# Patient Record
Sex: FEMALE | Race: OTHER | HISPANIC OR LATINO
[De-identification: names, ages, dates, MRNs, and addresses within clinical notes are randomized per-mention and may not be internally consistent; named-entity substitution may affect disease eponyms.]

---

## 2017-07-05 PROBLEM — Z00.00 ENCOUNTER FOR PREVENTIVE HEALTH EXAMINATION: Status: ACTIVE | Noted: 2017-07-05

## 2017-09-18 ENCOUNTER — APPOINTMENT (OUTPATIENT)
Dept: VASCULAR SURGERY | Facility: CLINIC | Age: 62
End: 2017-09-18
Payer: MEDICARE

## 2017-09-18 DIAGNOSIS — Z86.79 PERSONAL HISTORY OF OTHER DISEASES OF THE CIRCULATORY SYSTEM: ICD-10-CM

## 2017-09-18 PROCEDURE — 37765 STAB PHLEB VEINS XTR 10-20: CPT | Mod: LT

## 2017-09-19 PROBLEM — Z86.79 HISTORY OF HYPERTENSION: Status: RESOLVED | Noted: 2017-09-19 | Resolved: 2017-09-19

## 2017-09-22 ENCOUNTER — APPOINTMENT (OUTPATIENT)
Dept: VASCULAR SURGERY | Facility: CLINIC | Age: 62
End: 2017-09-22
Payer: MEDICARE

## 2017-09-25 ENCOUNTER — APPOINTMENT (OUTPATIENT)
Dept: VASCULAR SURGERY | Facility: CLINIC | Age: 62
End: 2017-09-25
Payer: MEDICARE

## 2017-09-25 DIAGNOSIS — I83.10 VARICOSE VEINS OF UNSPECIFIED LOWER EXTREMITY WITH INFLAMMATION: ICD-10-CM

## 2017-09-25 PROCEDURE — 99024 POSTOP FOLLOW-UP VISIT: CPT

## 2017-10-25 ENCOUNTER — RESULT REVIEW (OUTPATIENT)
Age: 62
End: 2017-10-25

## 2017-10-25 ENCOUNTER — OUTPATIENT (OUTPATIENT)
Dept: OUTPATIENT SERVICES | Facility: HOSPITAL | Age: 62
LOS: 1 days | Discharge: HOME | End: 2017-10-25
Payer: MEDICARE

## 2017-10-25 ENCOUNTER — APPOINTMENT (OUTPATIENT)
Dept: VASCULAR SURGERY | Facility: HOSPITAL | Age: 62
End: 2017-10-25

## 2017-10-25 PROCEDURE — 37765 STAB PHLEB VEINS XTR 10-20: CPT | Mod: 78,LT,GC

## 2017-10-30 LAB — SURGICAL PATHOLOGY STUDY: SIGNIFICANT CHANGE UP

## 2017-11-01 DIAGNOSIS — Z88.0 ALLERGY STATUS TO PENICILLIN: ICD-10-CM

## 2017-11-01 DIAGNOSIS — I10 ESSENTIAL (PRIMARY) HYPERTENSION: ICD-10-CM

## 2017-11-01 DIAGNOSIS — I83.812 VARICOSE VEINS OF LEFT LOWER EXTREMITY WITH PAIN: ICD-10-CM

## 2017-11-01 DIAGNOSIS — E66.9 OBESITY, UNSPECIFIED: ICD-10-CM

## 2017-11-03 ENCOUNTER — APPOINTMENT (OUTPATIENT)
Dept: VASCULAR SURGERY | Facility: CLINIC | Age: 62
End: 2017-11-03
Payer: MEDICARE

## 2017-11-03 PROCEDURE — 99024 POSTOP FOLLOW-UP VISIT: CPT

## 2019-06-20 ENCOUNTER — OTHER (OUTPATIENT)
Age: 64
End: 2019-06-20

## 2019-06-20 ENCOUNTER — APPOINTMENT (OUTPATIENT)
Dept: PULMONOLOGY | Facility: CLINIC | Age: 64
End: 2019-06-20
Payer: MEDICARE

## 2019-06-20 VITALS
DIASTOLIC BLOOD PRESSURE: 85 MMHG | BODY MASS INDEX: 29.82 KG/M2 | TEMPERATURE: 98.2 F | HEIGHT: 65 IN | WEIGHT: 179 LBS | SYSTOLIC BLOOD PRESSURE: 137 MMHG | HEART RATE: 73 BPM | OXYGEN SATURATION: 95 %

## 2019-06-20 DIAGNOSIS — Z82.49 FAMILY HISTORY OF ISCHEMIC HEART DISEASE AND OTHER DISEASES OF THE CIRCULATORY SYSTEM: ICD-10-CM

## 2019-06-20 DIAGNOSIS — Z83.3 FAMILY HISTORY OF DIABETES MELLITUS: ICD-10-CM

## 2019-06-20 DIAGNOSIS — Z82.5 FAMILY HISTORY OF ASTHMA AND OTHER CHRONIC LOWER RESPIRATORY DISEASES: ICD-10-CM

## 2019-06-20 DIAGNOSIS — G47.19 OTHER HYPERSOMNIA: ICD-10-CM

## 2019-06-20 DIAGNOSIS — R06.83 SNORING: ICD-10-CM

## 2019-06-20 DIAGNOSIS — J45.909 UNSPECIFIED ASTHMA, UNCOMPLICATED: ICD-10-CM

## 2019-06-20 PROCEDURE — 99204 OFFICE O/P NEW MOD 45 MIN: CPT

## 2019-06-20 NOTE — HISTORY OF PRESENT ILLNESS
[FreeTextEntry1] : 06/20/2019 :  JAVIER GARCIA is a 64 year retired female with PMHx HTN and snoring who is here for initial evaluation. \par She reports that has been always a bad sleeper. However, for the past 3 years her sleeping has got worst. She also has difficulty initiating and maintaining sleep. She also has been experiencing SIDDIQI while chiming on the stairs and walking. she underwent full cardiac work up and everything was "normal". She never had a Cxr or CT chest as part of her w/u.\par \par Sleep Routine:\par \par She goes to bed at 1-2AM, sleep latency is about 45 min-120min, she wakes up once, WASO is about 20 min and then she is up at 7-8Am. She does not nap. Her ESS is 11/24.\par \par She reports 30lb weight gain for the past 5 years, RLS and leg movement but denies cataplexy, parasomnia, or any other sleep behavioral issues\par \par \par \par \par

## 2019-06-20 NOTE — ASSESSMENT
[FreeTextEntry1] : 63 y/o F with chronic insomnia, SIDDIQI  and snoring who is here for initial evaluation

## 2019-06-20 NOTE — REVIEW OF SYSTEMS
[EDS: ESS=____] : daytime somnolence: ESS=[unfilled] [Recent Wt Gain (___ Lbs)] : recent [unfilled] ~Ulb weight gain [Snoring] : snoring [A.M. Dry Mouth] : a.m. dry mouth [Obesity] : obesity [Nocturia] : nocturia [Negative] : Psychiatric [FreeTextEntry9] : HTN

## 2019-06-20 NOTE — END OF VISIT
[FreeTextEntry3] : Case reviewed and patient examined with PA, plans as noted. Here for evaluation of both snoring and dyspnea.  Has had negative cardiac evaluation.  Gradually worsening SIDDIQI, SOB climbing 2 flights.  Get pulmonary function testing and CXR; overnight sleep study

## 2019-06-20 NOTE — PHYSICAL EXAM
[General Appearance - Well Developed] : well developed [Normal Appearance] : normal appearance [Well Groomed] : well groomed [General Appearance - Well Nourished] : well nourished [No Deformities] : no deformities [General Appearance - In No Acute Distress] : no acute distress [Normal Conjunctiva] : the conjunctiva exhibited no abnormalities [Eyelids - No Xanthelasma] : the eyelids demonstrated no xanthelasmas [IV] : IV [Neck Appearance] : the appearance of the neck was normal [Neck Cervical Mass (___cm)] : no neck mass was observed [Thyroid Diffuse Enlargement] : the thyroid was not enlarged [Jugular Venous Distention Increased] : there was no jugular-venous distention [Thyroid Nodule] : there were no palpable thyroid nodules [Heart Rate And Rhythm] : heart rate was normal and rhythm regular [Heart Sounds] : normal S1 and S2 [Heart Sounds Gallop] : no gallops [Murmurs] : no murmurs [Heart Sounds Pericardial Friction Rub] : no pericardial rub [Auscultation Breath Sounds / Voice Sounds] : lungs were clear to auscultation bilaterally [Abnormal Walk] : normal gait [Musculoskeletal - Swelling] : no joint swelling seen [Motor Tone] : muscle strength and tone were normal [Nail Clubbing] : no clubbing of the fingernails [Cyanosis, Localized] : no localized cyanosis [Petechial Hemorrhages (___cm)] : no petechial hemorrhages [Skin Color & Pigmentation] : normal skin color and pigmentation [Skin Turgor] : normal skin turgor [] : no rash [Cranial Nerves] : cranial nerves 2-12 were intact [Deep Tendon Reflexes (DTR)] : deep tendon reflexes were 2+ and symmetric [Sensation] : the sensory exam was normal to light touch and pinprick [No Focal Deficits] : no focal deficits [Oriented To Time, Place, And Person] : oriented to person, place, and time [Impaired Insight] : insight and judgment were intact [Affect] : the affect was normal

## 2019-06-24 ENCOUNTER — OTHER (OUTPATIENT)
Age: 64
End: 2019-06-24

## 2019-07-08 ENCOUNTER — APPOINTMENT (OUTPATIENT)
Dept: SLEEP CENTER | Facility: HOME HEALTH | Age: 64
End: 2019-07-08
Payer: MEDICARE

## 2019-07-08 ENCOUNTER — OUTPATIENT (OUTPATIENT)
Dept: OUTPATIENT SERVICES | Facility: HOSPITAL | Age: 64
LOS: 1 days | End: 2019-07-08
Payer: MEDICARE

## 2019-07-08 PROCEDURE — 95800 SLP STDY UNATTENDED: CPT

## 2019-07-08 PROCEDURE — 95800 SLP STDY UNATTENDED: CPT | Mod: 26

## 2019-07-09 DIAGNOSIS — G47.33 OBSTRUCTIVE SLEEP APNEA (ADULT) (PEDIATRIC): ICD-10-CM

## 2019-07-12 ENCOUNTER — OTHER (OUTPATIENT)
Age: 64
End: 2019-07-12

## 2019-07-30 ENCOUNTER — FORM ENCOUNTER (OUTPATIENT)
Age: 64
End: 2019-07-30

## 2019-07-31 ENCOUNTER — APPOINTMENT (OUTPATIENT)
Dept: PULMONOLOGY | Facility: CLINIC | Age: 64
End: 2019-07-31
Payer: MEDICARE

## 2019-07-31 ENCOUNTER — OUTPATIENT (OUTPATIENT)
Dept: OUTPATIENT SERVICES | Facility: HOSPITAL | Age: 64
LOS: 1 days | End: 2019-07-31
Payer: MEDICARE

## 2019-07-31 ENCOUNTER — NON-APPOINTMENT (OUTPATIENT)
Age: 64
End: 2019-07-31

## 2019-07-31 VITALS
SYSTOLIC BLOOD PRESSURE: 120 MMHG | WEIGHT: 182 LBS | DIASTOLIC BLOOD PRESSURE: 70 MMHG | HEART RATE: 71 BPM | HEIGHT: 65 IN | OXYGEN SATURATION: 98 % | TEMPERATURE: 98.7 F | BODY MASS INDEX: 30.32 KG/M2 | RESPIRATION RATE: 12 BRPM

## 2019-07-31 DIAGNOSIS — G47.30 SLEEP APNEA, UNSPECIFIED: ICD-10-CM

## 2019-07-31 PROCEDURE — 94010 BREATHING CAPACITY TEST: CPT

## 2019-07-31 PROCEDURE — 99214 OFFICE O/P EST MOD 30 MIN: CPT | Mod: 25

## 2019-07-31 PROCEDURE — 71046 X-RAY EXAM CHEST 2 VIEWS: CPT

## 2019-07-31 PROCEDURE — 71046 X-RAY EXAM CHEST 2 VIEWS: CPT | Mod: 26

## 2019-07-31 NOTE — PHYSICAL EXAM
[Heart Rate And Rhythm] : heart rate and rhythm were normal [Heart Sounds] : normal S1 and S2 [Murmurs] : no murmurs present [Respiration, Rhythm And Depth] : normal respiratory rhythm and effort [Auscultation Breath Sounds / Voice Sounds] : lungs were clear to auscultation bilaterally [Exaggerated Use Of Accessory Muscles For Inspiration] : no accessory muscle use [FreeTextEntry1] : no wheeze even forced exp [Abnormal Walk] : normal gait [Gait - Sufficient For Exercise Testing] : the gait was sufficient for exercise testing [Nail Clubbing] : no clubbing of the fingernails [Petechial Hemorrhages (___cm)] : no petechial hemorrhages [] : no ischemic changes [Cyanosis, Localized] : no localized cyanosis
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
## 2019-07-31 NOTE — HISTORY OF PRESENT ILLNESS
[FreeTextEntry1] : 07/31/2019 :  JAVIER GARCIA is a 64 year female with PMHx HTN, asthma (in childhood, no recent use of inhaler or hospitalization) who is here for follow up for her HPSG.\par \par The test showed AHI 15.5 with minimum desaturation. She is still complaining of SIDDIQI while climbing 2 flights up the stairs. THe PFT which was ordered last month did not get done. She reports seasonal allergy.\par She denies any new diagnosis or medication since the last visit.  \par

## 2019-08-06 ENCOUNTER — APPOINTMENT (OUTPATIENT)
Dept: PULMONOLOGY | Facility: CLINIC | Age: 64
End: 2019-08-06
Payer: MEDICARE

## 2019-08-06 DIAGNOSIS — R06.02 SHORTNESS OF BREATH: ICD-10-CM

## 2019-08-06 PROCEDURE — 94010 BREATHING CAPACITY TEST: CPT

## 2019-08-06 PROCEDURE — 94729 DIFFUSING CAPACITY: CPT

## 2019-08-06 PROCEDURE — 94726 PLETHYSMOGRAPHY LUNG VOLUMES: CPT

## 2019-08-06 PROCEDURE — ZZZZZ: CPT

## 2019-08-08 PROBLEM — R06.02 SOBOE (SHORTNESS OF BREATH ON EXERTION): Status: ACTIVE | Noted: 2019-06-20

## 2019-11-04 ENCOUNTER — APPOINTMENT (OUTPATIENT)
Dept: PULMONOLOGY | Facility: CLINIC | Age: 64
End: 2019-11-04

## 2020-08-04 ENCOUNTER — APPOINTMENT (OUTPATIENT)
Dept: ORTHOPEDIC SURGERY | Facility: CLINIC | Age: 65
End: 2020-08-04
Payer: MEDICARE

## 2020-08-04 VITALS
HEIGHT: 65 IN | OXYGEN SATURATION: 98 % | DIASTOLIC BLOOD PRESSURE: 76 MMHG | WEIGHT: 168 LBS | SYSTOLIC BLOOD PRESSURE: 137 MMHG | BODY MASS INDEX: 27.99 KG/M2 | HEART RATE: 79 BPM

## 2020-08-04 DIAGNOSIS — Z72.3 LACK OF PHYSICAL EXERCISE: ICD-10-CM

## 2020-08-04 DIAGNOSIS — Z60.2 PROBLEMS RELATED TO LIVING ALONE: ICD-10-CM

## 2020-08-04 PROCEDURE — 73030 X-RAY EXAM OF SHOULDER: CPT | Mod: RT

## 2020-08-04 PROCEDURE — 99204 OFFICE O/P NEW MOD 45 MIN: CPT | Mod: 25

## 2020-08-04 PROCEDURE — 20610 DRAIN/INJ JOINT/BURSA W/O US: CPT | Mod: LT

## 2020-08-04 PROCEDURE — 73564 X-RAY EXAM KNEE 4 OR MORE: CPT | Mod: LT

## 2020-08-04 RX ORDER — AMLODIPINE BESYLATE 2.5 MG/1
2.5 TABLET ORAL
Refills: 0 | Status: ACTIVE | COMMUNITY

## 2020-08-04 SDOH — SOCIAL STABILITY - SOCIAL INSECURITY: PROBLEMS RELATED TO LIVING ALONE: Z60.2

## 2020-08-04 NOTE — REASON FOR VISIT
[Initial Visit] : an initial visit for [Shoulder Injury] : Shoulder Injury [Knee Injury] : knee injury

## 2020-08-04 NOTE — PROCEDURE
[Left] : of the left [Injection] : Injection [Knee Joint] : knee joint [Osteoarthritis] : Osteoarthritis [Joint Pain] : Joint pain [Patient] : patient [Benefits] : benefits [Risk] : risk [Bleeding] : bleeding [Infection] : infection [Alternatives] : alternatives [Verbal Consent Obtained] : verbal consent was obtained prior to the procedure [Allergic Reaction] : allergic reaction [Betadine] : Betadine [Alcohol] : Alcohol [Ethyl Chloride Spray] : ethyl chloride spray was used as a topical anesthetic [Anterior] : anterior [Lateral] : lateral [22] : a 22-gauge [1% Lidocaine___(mL)] : [unfilled] mL of 1% Lidocaine [Methylpred. 40mg/mL___(mL)] : [unfilled] mL of 40mg/mL methylprednisolone [Tolerated Well] : The patient tolerated the procedure well [Bandage Applied] : a bandage [No Strenuous Activity___day(s)] : avoid strenuous activity for [unfilled] day(s) [None] : none [de-identified] : ood pain relief after it she could flex about another 10° but still not fully [___ Week(s)] : in [unfilled] week(s)

## 2020-08-04 NOTE — PHYSICAL EXAM
[UE/LE] : Sensory: Intact in bilateral upper & lower extremities [Rad] : radial 2+ and symmetric bilaterally [Normal RUE] : Right Upper Extremity: No scars, rashes, lesions, ulcers, skin intact [Normal LUE] : Left Upper Extremity: No scars, rashes, lesions, ulcers, skin intact [Normal RLE] : Right Lower Extremity: No scars, rashes, lesions, ulcers, skin intact [Normal LLE] : Left Lower Extremity: No scars, rashes, lesions, ulcers, skin intact [Normal Touch] : sensation intact for touch [Normal] : Oriented to person, place, and time, insight and judgement were intact and the affect was normal [Poor Appearance] : well-appearing [Acute Distress] : not in acute distress [de-identified] : rIGHT shoulder/ lEFT for comparison\par Cervical spine is without tenderness and ROM is within normal limits and without pain.\par Normal appearance. No deformity, edema, ecchymoses, erythema of RIGHT shoulder\par AROM: RIGHT 90 versus LEFT 180 FE, IR to T 9 equal , 80 RIGHT versus LEFT 180 abd.\par if she bends her elbow and she can elevate her RIGHT arm overhead to about 170°.\par Painful arc RIGHT shoulder mid rangewith drop arm as she lowers her RIGHT arm from overhead position.\par PROM: plan 70 RIGHT versus LEFT 180 FE, 70 ER at the side, 90 ER and  IR in the 90 degree abducted position.\par Motor:  5-/5  supraspinatus,  5/5 ER, 5/5 IR, 5/5 biceps, 5/5 deltoid.  Normal lift off test\par rIGHT + Neer, + Murdock. -  X-arm.   \par Tender over the biceps tendon and the anterior shoulder.  \par Laxity: normal. \par No scapular winging.\par Sensation is intact distally in the UE.\par Skin is intact in the UE. \par Intact Motor distally.\par \par Knees:\par Mildly antalgic gait.\par - effusion.\par - erythema, edema, warmth.\par Tender Joint lines LEFT knee.\par ROM: 0 degrees extension to 90 degrees flexion LEFT knee versus 125° on the RIGHT knee. severe LEFT greater than RIGHT patellofemoral crepitus\par - Eber.\par 1A Lachman.  - Pivot shift. - posterior drawer. Normal rotational, varus/valgus laxity.\par Intact extensor mechanism.\par NVI distally.\par  [de-identified] : No respiratory distress or cough [de-identified] : \par X-rays RIGHT shoulder AP, Y. lateral and axillary views today show Degenerative changes at the a.c. joint with large undersurface osteophytes. No fractures\par \par X-rays LEFT knee weightbearing 4 views today show moderate medial compartment osteoarthritis with osteophytes and joint space narrowing Minimal degeneration patellofemoral joint. No fractures seen
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
## 2020-09-22 NOTE — REASON FOR VISIT
[Follow-Up Visit] : a follow-up visit for [Shoulder Injury] : Shoulder Injury [Knee Injury] : knee injury

## 2020-09-23 ENCOUNTER — APPOINTMENT (OUTPATIENT)
Dept: ORTHOPEDIC SURGERY | Facility: CLINIC | Age: 65
End: 2020-09-23
Payer: MEDICARE

## 2020-09-23 PROCEDURE — 99215 OFFICE O/P EST HI 40 MIN: CPT

## 2020-09-23 RX ORDER — HYALURONATE SODIUM 20 MG/2 ML
20 SYRINGE (ML) INTRAARTICULAR
Qty: 1 | Refills: 0 | Status: ACTIVE | COMMUNITY
Start: 2020-09-23 | End: 1900-01-01
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
## 2020-09-23 NOTE — PHYSICAL EXAM
[Rad] : radial 2+ and symmetric bilaterally [Normal RUE] : Right Upper Extremity: No scars, rashes, lesions, ulcers, skin intact [Normal LUE] : Left Upper Extremity: No scars, rashes, lesions, ulcers, skin intact [Normal RLE] : Right Lower Extremity: No scars, rashes, lesions, ulcers, skin intact [Normal LLE] : Left Lower Extremity: No scars, rashes, lesions, ulcers, skin intact [Normal Touch] : sensation intact for touch [Normal] : Oriented to person, place, and time, insight and judgement were intact and the affect was normal [UE/LE] : Sensory: Intact in bilateral upper & lower extremities [Poor Appearance] : well-appearing [Acute Distress] : not in acute distress [de-identified] : RIGHT shoulder/ lEFT for comparison\par Cervical spine is without tenderness and ROM is within normal limits and without pain.\par Normal appearance. No deformity, edema, ecchymoses, erythema of RIGHT shoulder\par AROM: RIGHT 170 with painful arc versus LEFT 180 FE, IR to T 9 equal , 100 RIGHT versus LEFT 180 abd.\par if she bends her elbow and she can elevate her RIGHT arm overhead to about 170°.\par Painful arc RIGHT shoulder mid range with drop arm as she lowers her RIGHT arm from overhead position.\par PROM: 170 RIGHT versus LEFT 180 FE, 70 ER at the side, 90 ER and 5 RIGHT and 45 LEFT IR in the 90 degree abducted position.\par Motor:  5-/5  supraspinatus,  5/5 ER, 5/5 IR, 5/5 biceps, 5/5 deltoid.  Normal lift off test\par rIGHT + Neer, + Murdock. -  X-arm.   \par Tender over the biceps tendon and the anterior shoulder.  \par Laxity: normal. \par No scapular winging.\par Sensation is intact distally in the UE.\par Skin is intact in the UE. \par Intact Motor distally.\par \par Knees:\par Mildly antalgic gait. she walks with a stiff legged gait\par small effusion lEFT knee\par - erythema, edema, warmth.\par Tender Joint lines LEFT knee Medial greater than lateral.\par ROM: 0 degrees extension to 90 degrees flexion LEFT knee With severe pain versus 125° on the RIGHT knee. + patellofemoral crepitus\par - Eber.\par 1A Lachman.  - Pivot shift. - posterior drawer. Normal rotational, varus/valgus laxity.\par Intact extensor mechanism.\par NVI distally.\par  [de-identified] : No respiratory distress or cough [de-identified] : \par MRIs of the LEFT knee and RIGHT shoulder were reviewed with the patient. Reports are described above and are in the record. She was given copies of the MRI reports

## 2020-10-26 ENCOUNTER — APPOINTMENT (OUTPATIENT)
Dept: ORTHOPEDIC SURGERY | Facility: CLINIC | Age: 65
End: 2020-10-26

## 2020-10-28 ENCOUNTER — APPOINTMENT (OUTPATIENT)
Dept: ORTHOPEDIC SURGERY | Facility: CLINIC | Age: 65
End: 2020-10-28
Payer: MEDICARE

## 2020-10-28 VITALS — HEIGHT: 65 IN | WEIGHT: 168 LBS | BODY MASS INDEX: 27.99 KG/M2

## 2020-10-28 PROCEDURE — 99072 ADDL SUPL MATRL&STAF TM PHE: CPT

## 2020-10-28 PROCEDURE — 99214 OFFICE O/P EST MOD 30 MIN: CPT | Mod: 25

## 2020-10-28 PROCEDURE — 20610 DRAIN/INJ JOINT/BURSA W/O US: CPT | Mod: LT

## 2020-10-28 NOTE — PROCEDURE
[Injection] : Injection [Left] : of the left [Knee Joint] : knee joint [Osteoarthritis] : Osteoarthritis [Patient] : patient [Risk] : risk [Benefits] : benefits [Alternatives] : alternatives [Bleeding] : bleeding [Infection] : infection [Allergic Reaction] : allergic reaction [Verbal Consent Obtained] : verbal consent was obtained prior to the procedure [Alcohol] : Alcohol [Betadine] : Betadine [Ethyl Chloride Spray] : ethyl chloride spray was used as a topical anesthetic [Lateral] : lateral [Same Needle/New Syringe] : the syringe was changed and the same needle was left in place and [Bandage Applied] : a bandage [Tolerated Well] : The patient tolerated the procedure well [None] : none [No Strenuous Activity___day(s)] : avoid strenuous activity for [unfilled] day(s) [___ Month(s)] : in [unfilled] month(s) [FreeTextEntry8] : Gel-One 3ml, lot [id number], exp 8/18/22

## 2020-10-28 NOTE — PHYSICAL EXAM
[UE/LE] : Sensory: Intact in bilateral upper & lower extremities [Rad] : radial 2+ and symmetric bilaterally [Normal RUE] : Right Upper Extremity: No scars, rashes, lesions, ulcers, skin intact [Normal LUE] : Left Upper Extremity: No scars, rashes, lesions, ulcers, skin intact [Normal RLE] : Right Lower Extremity: No scars, rashes, lesions, ulcers, skin intact [Normal LLE] : Left Lower Extremity: No scars, rashes, lesions, ulcers, skin intact [Normal Touch] : sensation intact for touch [Normal] : Oriented to person, place, and time, insight and judgement were intact and the affect was normal [Poor Appearance] : well-appearing [Acute Distress] : not in acute distress [de-identified] : RIGHT shoulder/ LEFT for comparison\par Cervical spine is without tenderness and ROM is within normal limits and without pain.\par Normal appearance. No deformity, edema, ecchymoses, erythema of RIGHT shoulder\par AROM: RIGHT 175 without Shoulder shrug or any significant pain, IR to T 9 equal , 150 RIGHT versus LEFT 180 abd.\par No drop arm\par PROM: 175 RIGHT versus LEFT 180 FE, 70 ER at the side, 90 ER and 25 RIGHT and 45 LEFT IR in the 90 degree abducted position.\par Motor:  5-/5  supraspinatus,  5/5 ER, 5/5 IR, 5/5 biceps, 5/5 deltoid.  Normal lift off test\par RiGHT + Neer, + Murdock. -  X-arm.   pain is much less however with range of motion and provocative testing today and strength testing\par Tender over the biceps tendon and the anterior shoulder.  \par Laxity: normal. \par No scapular winging.\par Sensation is intact distally in the UE.\par Skin is intact in the UE. \par Intact Motor distally.\par \par Knees:\par Mildly antalgic gait. she walks with a stiff legged gait\par small effusion lEFT knee\par - erythema, edema, warmth.\par Tender Joint lines LEFT knee Medial greater than lateral.\par ROM: 0 degrees extension to 90 degrees flexion LEFT knee with severe pain versus 125° on the RIGHT knee. + patellofemoral crepitus\par - Eber.\par 1A Lachman.  - Pivot shift. - posterior drawer. Normal rotational, varus/valgus laxity.\par Intact extensor mechanism.\par NVI distally.\par  [de-identified] : No respiratory distress or cough [de-identified] : \par MRI of the RIGHT shoulder on August 31, 2020 showed a very small but full-thickness tear of the central supraspinatus with moderate tendinosis rotator cuff tendons. There is also tendinosis of the long head of the biceps and degeneration of the superior labrum. Moderate subacromial subdeltoid bursitis and severe osteoarthritis of the a.c. joint.\par \par MRI of the LEFT knee on September 15, 2020  showed tricompartmental osteoarthritis moderate to severe in the medial compartment and full thickness radial tear of the medial meniscal root and horizontal tear and extrusion of the body of the meniscus and mucoid degeneration of the ACL and the moderate knee effusion.
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
## 2020-12-02 ENCOUNTER — APPOINTMENT (OUTPATIENT)
Dept: ORTHOPEDIC SURGERY | Facility: CLINIC | Age: 65
End: 2020-12-02

## 2021-08-05 DIAGNOSIS — M23.204 DERANGEMENT OF UNSPECIFIED MEDIAL MENISCUS DUE TO OLD TEAR OR INJURY, LEFT KNEE: ICD-10-CM

## 2021-08-06 ENCOUNTER — APPOINTMENT (OUTPATIENT)
Dept: ORTHOPEDIC SURGERY | Facility: CLINIC | Age: 66
End: 2021-08-06
Payer: MEDICARE

## 2021-08-06 DIAGNOSIS — M19.011 PRIMARY OSTEOARTHRITIS, RIGHT SHOULDER: ICD-10-CM

## 2021-08-06 DIAGNOSIS — S46.011A STRAIN OF MUSCLE(S) AND TENDON(S) OF THE ROTATOR CUFF OF RIGHT SHOULDER, INITIAL ENCOUNTER: ICD-10-CM

## 2021-08-06 DIAGNOSIS — S80.02XD CONTUSION OF LEFT KNEE, SUBSEQUENT ENCOUNTER: ICD-10-CM
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
## 2021-08-06 PROCEDURE — 73564 X-RAY EXAM KNEE 4 OR MORE: CPT | Mod: LT

## 2021-08-06 PROCEDURE — 73030 X-RAY EXAM OF SHOULDER: CPT | Mod: RT

## 2021-08-06 PROCEDURE — 99214 OFFICE O/P EST MOD 30 MIN: CPT

## 2021-08-06 RX ORDER — HYLAN G-F 20 16MG/2ML
48 SYRINGE (ML) INTRAARTICULAR
Qty: 2 | Refills: 0 | Status: ACTIVE | OUTPATIENT
Start: 2021-08-06
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
## 2021-08-24 NOTE — PHYSICAL EXAM
[Slightly Antalgic] : slightly antalgic [UE/LE] : Motor: 5/5 motor strength in bilateral upper & lower extremities [Poor Appearance] : well-appearing [Acute Distress] : not in acute distress [de-identified] : RIGHT shoulder/ LEFT for comparison\par Cervical spine is without tenderness and ROM is within normal limits and without pain.\par Normal appearance. No deformity, edema, ecchymoses, erythema of RIGHT shoulder\par AROM: RIGHT 175 without Shoulder shrug or painful arc, IR to T 9 equal , 170 RIGHT versus LEFT 180 abd.\par No drop arm\par PROM: 175 RIGHT versus LEFT 180 FE, 70 ER at the side, 90 ER and 35 RIGHT and 45 LEFT IR in the 90 degree abducted position.\par Motor:  5/5  supraspinatus,  5/5 ER, 5/5 IR, 5/5 biceps, 5/5 deltoid.  Normal lift off test\par RiGHT + Neer, + Murdock. -  X-arm.   pain is much less however with range of motion and provocative testing today and strength testing\par Tender over the biceps tendon and the anterior shoulder.  \par Laxity: normal. \par No scapular winging.\par Sensation is intact distally in the UE.\par Skin is intact in the UE. \par Intact Motor distally.\par \par Knees:\par Mildly antalgic gait.\par - effusion lEFT knee\par - erythema, edema, warmth.\par Tender Joint lines LEFT knee Medial greater than lateral.  Mildly tender medial left patella facet\par ROM: 0 degrees extension to 110 degrees flexion LEFT knee with severe pain versus 125° on the RIGHT knee. + patellofemoral crepitus\par - Eber.\par 1A Lachman.  - Pivot shift. - posterior drawer. Normal rotational, varus/valgus laxity.\par Intact extensor mechanism.\par NVI distally.\par  [de-identified] : No respiratory distress or cough [de-identified] : \par MRI of the RIGHT shoulder on August 31, 2020 showed a very small but full-thickness tear of the central supraspinatus with moderate tendinosis rotator cuff tendons. There is also tendinosis of the long head of the biceps and degeneration of the superior labrum. Moderate subacromial subdeltoid bursitis and severe osteoarthritis of the a.c. joint.\par \par MRI of the LEFT knee on September 15, 2020  showed tricompartmental osteoarthritis moderate to severe in the medial compartment and full thickness radial tear of the medial meniscal root and horizontal tear and extrusion of the body of the meniscus and mucoid degeneration of the ACL and the moderate knee effusion.\par \par X-rays of the left knee weightbearing 4 views taken today showed moderate degenerative changes in the medial compartment with osteophyte and joint space narrowing and more mild degeneration patellofemoral joint similar to the x-rays taken last year of the knee.\par \par X-rays of the right shoulder AP, Y lateral and axillary views ordered and performed today showed AC joint arthritis.  Otherwise unremarkable and unchanged from last year
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
## 2021-09-16 NOTE — PHYSICAL EXAM
[Slightly Antalgic] : slightly antalgic [UE/LE] : Sensory: Intact in bilateral upper & lower extremities [Rad] : radial 2+ and symmetric bilaterally [Normal RUE] : Right Upper Extremity: No scars, rashes, lesions, ulcers, skin intact [Normal LUE] : Left Upper Extremity: No scars, rashes, lesions, ulcers, skin intact [Normal RLE] : Right Lower Extremity: No scars, rashes, lesions, ulcers, skin intact [Normal LLE] : Left Lower Extremity: No scars, rashes, lesions, ulcers, skin intact [Normal Touch] : sensation intact for touch [Poor Appearance] : well-appearing [Acute Distress] : not in acute distress [Normal] : Oriented to person, place, and time, insight and judgement were intact and the affect was normal [de-identified] : RIGHT shoulder/ LEFT for comparison\par Cervical spine is without tenderness and ROM is within normal limits and without pain.\par Normal appearance. No deformity, edema, ecchymoses, erythema of RIGHT shoulder\par AROM: RIGHT 175 without Shoulder shrug or painful arc, IR to T 9 equal , 170 RIGHT versus LEFT 180 abd.\par No drop arm\par PROM: 175 RIGHT versus LEFT 180 FE, 70 ER at the side, 90 ER and 35 RIGHT and 45 LEFT IR in the 90 degree abducted position.\par Motor:  5/5  supraspinatus,  5/5 ER, 5/5 IR, 5/5 biceps, 5/5 deltoid.  Normal lift off test\par RiGHT + Neer, + Murdock. -  X-arm.   pain is much less however with range of motion and provocative testing today and strength testing\par Tender over the biceps tendon and the anterior shoulder.  \par Laxity: normal. \par No scapular winging.\par Sensation is intact distally in the UE.\par Skin is intact in the UE. \par Intact Motor distally.\par \par Knees:\par Mildly antalgic gait.\par - effusion lEFT knee\par - erythema, edema, warmth.\par Tender Joint lines LEFT knee Medial greater than lateral.  Mildly tender medial left patella facet\par ROM: 0 degrees extension to 110 degrees flexion LEFT knee with severe pain versus 125° on the RIGHT knee. + patellofemoral crepitus\par - Eber.\par 1A Lachman.  - Pivot shift. - posterior drawer. Normal rotational, varus/valgus laxity.\par Intact extensor mechanism.\par NVI distally.\par  [de-identified] : No respiratory distress or cough [de-identified] : \par MRI of the RIGHT shoulder on August 31, 2020 showed a very small but full-thickness tear of the central supraspinatus with moderate tendinosis rotator cuff tendons. There is also tendinosis of the long head of the biceps and degeneration of the superior labrum. Moderate subacromial subdeltoid bursitis and severe osteoarthritis of the a.c. joint.\par \par MRI of the LEFT knee on September 15, 2020  showed tricompartmental osteoarthritis moderate to severe in the medial compartment and full thickness radial tear of the medial meniscal root and horizontal tear and extrusion of the body of the meniscus and mucoid degeneration of the ACL and the moderate knee effusion.\par \par X-rays of the left knee weightbearing 4 views taken today showed moderate degenerative changes in the medial compartment with osteophyte and joint space narrowing and more mild degeneration patellofemoral joint similar to the x-rays taken last year of the knee.\par \par X-rays of the right shoulder AP, Y lateral and axillary views ordered and performed today showed AC joint arthritis.  Otherwise unremarkable and unchanged from last year

## 2021-09-17 ENCOUNTER — APPOINTMENT (OUTPATIENT)
Dept: ORTHOPEDIC SURGERY | Facility: CLINIC | Age: 66
End: 2021-09-17

## 2022-11-09 ENCOUNTER — APPOINTMENT (OUTPATIENT)
Dept: ORTHOPEDIC SURGERY | Facility: CLINIC | Age: 67
End: 2022-11-09

## 2022-11-09 DIAGNOSIS — M25.462 EFFUSION, LEFT KNEE: ICD-10-CM

## 2022-11-09 PROCEDURE — 73564 X-RAY EXAM KNEE 4 OR MORE: CPT | Mod: LT

## 2022-11-09 PROCEDURE — 99214 OFFICE O/P EST MOD 30 MIN: CPT | Mod: 25

## 2022-11-09 PROCEDURE — 20610 DRAIN/INJ JOINT/BURSA W/O US: CPT | Mod: LT

## 2022-11-09 NOTE — ASSESSMENT
[FreeTextEntry1] : 67-year-old with left knee severe osteoarthritis with worsening pain and swelling.  Today an aspiration and steroid injection were done.  She should work on range of motion and do leg lifts.  She was referred to physical therapy which she should start next week.  Ice intermittently.\par She can take ibuprofen 400 mg 3 times daily with meals for the pain and inflammation and alternate with Tylenol.  If her knee is feeling better then decrease or stop the ibuprofen.\par Follow-up in about 5 weeks to check on her progress.  We may consider hyaluronic acid injection.  Ultimately I think she needs a knee replacement but we will see if we can get her pain to subside at least for a while.

## 2022-11-09 NOTE — HISTORY OF PRESENT ILLNESS
[de-identified] : 66 y/o woman comes in for follow up for knee pain which has become much more severe recently.  Right knee is doing okay.  I had seen her a year ago and she did physical therapy at that time but did not keep up with her exercises.  She is limping and there is stiffness.  Her knee and leg are swelling.\par Steroid injection was performed in October 2020.\par She was last seen in Aug 2021 for RIGHT shoulder and LEFT knee pain

## 2022-11-09 NOTE — PROCEDURE
[Aspiration] : Aspiration [Injection] : Injection [Left] : of the left [Knee Joint] : knee joint [Effusion] : Effusion [Osteoarthritis] : Osteoarthritis [Patient] : patient [Risk] : risk [Benefits] : benefits [Alternatives] : alternatives [Bleeding] : bleeding [Infection] : infection [Allergic Reaction] : allergic reaction [Verbal Consent Obtained] : verbal consent was obtained prior to the procedure [Alcohol] : Alcohol [Ethyl Chloride Spray] : ethyl chloride spray was used as a topical anesthetic [Lateral] : lateral [20] : a 20-gauge [___ mL Fluid] : [unfilled] mL of [Yellow] : yellow [Clear] : clear [Same Needle/New Syringe] : the syringe was changed and the same needle was left in place and [1% Lidocaine___(mL)] : [unfilled] mL of 1% Lidocaine [Triamcinolone 40mg/mL___(mL)] : [unfilled] ~UmL of 40mg/mL triamcinolone [Bandage Applied] : a bandage [Tolerated Well] : The patient tolerated the procedure well [None] : none [No Strenuous Activity___day(s)] : avoid strenuous activity for [unfilled] day(s) [___ Week(s)] : in [unfilled] week(s) [FreeTextEntry1] : ChloraPrep

## 2022-11-09 NOTE — PHYSICAL EXAM
[UE/LE] : Sensory: Intact in bilateral upper & lower extremities [Rad] : radial 2+ and symmetric bilaterally [Normal RUE] : Right Upper Extremity: No scars, rashes, lesions, ulcers, skin intact [Normal LUE] : Left Upper Extremity: No scars, rashes, lesions, ulcers, skin intact [Normal RLE] : Right Lower Extremity: No scars, rashes, lesions, ulcers, skin intact [Normal LLE] : Left Lower Extremity: No scars, rashes, lesions, ulcers, skin intact [Normal Touch] : sensation intact for touch [Poor Appearance] : well-appearing [Acute Distress] : not in acute distress [Normal] : Oriented to person, place, and time, insight and judgement were intact and the affect was normal [de-identified] : Knees:\par Mild to moderately antalgic gait.\par 1+ effusion left knee and none right knee\par - erythema, edema, warmth.\par Tender LEFT knee Medial greater than lateral joint line.  Mildly tender medial left patella facet\par ROM: 0 degrees extension to 100 degrees flexion LEFT knee with severe pain versus 125° on the RIGHT knee. + patellofemoral crepitus\par - Eber.\par 1A Lachman.  - Pivot shift. - posterior drawer. Normal rotational, varus/valgus laxity.\par Intact extensor mechanism.\par NVI distally.\par  [de-identified] : Overweight [de-identified] : No respiratory distress or cough [de-identified] : \par MRI of the LEFT knee on September 15, 2020  showed tricompartmental osteoarthritis moderate to severe in the medial compartment and full thickness radial tear of the medial meniscal root and horizontal tear and extrusion of the body of the meniscus and mucoid degeneration of the ACL and the moderate knee effusion.\par \par X-rays of the left knee weightbearing 4 views taken today compared to last set of x-rays showed moderate to severe degenerative changes in the medial compartment with larger osteophyte and greater joint space narrowing and mild degeneration patellofemoral joint

## 2022-12-14 ENCOUNTER — APPOINTMENT (OUTPATIENT)
Dept: ORTHOPEDIC SURGERY | Facility: CLINIC | Age: 67
End: 2022-12-14

## 2023-06-02 ENCOUNTER — NON-APPOINTMENT (OUTPATIENT)
Age: 68
End: 2023-06-02

## 2023-06-02 ENCOUNTER — APPOINTMENT (OUTPATIENT)
Dept: ORTHOPEDIC SURGERY | Facility: CLINIC | Age: 68
End: 2023-06-02
Payer: MEDICARE

## 2023-06-02 DIAGNOSIS — M17.12 UNILATERAL PRIMARY OSTEOARTHRITIS, LEFT KNEE: ICD-10-CM

## 2023-06-02 PROCEDURE — 99214 OFFICE O/P EST MOD 30 MIN: CPT

## 2023-06-02 RX ORDER — CELECOXIB 200 MG/1
200 CAPSULE ORAL
Qty: 27 | Refills: 1 | Status: ACTIVE | COMMUNITY
Start: 2023-06-02 | End: 1900-01-01

## 2023-06-02 NOTE — DISCUSSION/SUMMARY
[Medication Risks Reviewed] : Medication risks reviewed [de-identified] : We reviewed at length with the patient the underlying etiology of her left knee pain.  Patient has a diagnosis confirmed today by radiographs of moderate to advanced osteoarthritis of the left knee in particular in the medial compartment as well as patellofemoral articulation.  Patient has had previous HA injections as well as Synvisc 1 injection by report she has not seen any significant improvement.  At this point we recommended a trial of Celebrex 200 mg p.o. twice daily for 6-day course then to be taken thereafter as needed.  The reason risk benefits of medication were discussed in detail including potential side effects.  We reviewed her current medication profile and appears to be no relative contraindication to its current use.\par \par Patient will follow-up in 2 weeks if not thoroughly improved may consider cortisone injection.  Daughter talked at length about how the patient may need to consider knee replacement surgery should not see sustained symptomatic improvement with conservative measures.\par \par Today's consultation lasted 45 minutes

## 2023-06-02 NOTE — PHYSICAL EXAM
[de-identified] : Left knee on exam today is definite warmth soft tissue swelling minimal effusion however there is definite medial joint line tenderness range of motion 0 to 125 degrees knee stable to distress varus valgus stress in both full extension and 90 degrees of flexion. [de-identified] : Knee radiographs were ordered today AP standing individual lateral sunrise views were obtained showing significant medial narrowing of the medial compartment and moderate degenerative changes of the patellofemoral articulation on sunrise view.  No evidence of recent accident injury or trauma.
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
## 2023-06-16 ENCOUNTER — APPOINTMENT (OUTPATIENT)
Dept: ORTHOPEDIC SURGERY | Facility: CLINIC | Age: 68
End: 2023-06-16

## 2025-06-25 ENCOUNTER — OUTPATIENT (OUTPATIENT)
Dept: OUTPATIENT SERVICES | Facility: HOSPITAL | Age: 70
LOS: 1 days | End: 2025-06-25
Payer: MEDICARE

## 2025-06-25 DIAGNOSIS — R06.09 OTHER FORMS OF DYSPNEA: ICD-10-CM

## 2025-06-25 PROCEDURE — 78452 HT MUSCLE IMAGE SPECT MULT: CPT | Mod: 26

## 2025-06-25 PROCEDURE — A9500: CPT

## 2025-06-25 PROCEDURE — 78452 HT MUSCLE IMAGE SPECT MULT: CPT

## 2025-06-25 PROCEDURE — 93018 CV STRESS TEST I&R ONLY: CPT

## 2025-06-25 PROCEDURE — 93017 CV STRESS TEST TRACING ONLY: CPT

## 2025-06-25 PROCEDURE — 93016 CV STRESS TEST SUPVJ ONLY: CPT
